# Patient Record
Sex: MALE | Race: WHITE | Employment: STUDENT | URBAN - METROPOLITAN AREA
[De-identification: names, ages, dates, MRNs, and addresses within clinical notes are randomized per-mention and may not be internally consistent; named-entity substitution may affect disease eponyms.]

---
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
## 2024-09-01 ENCOUNTER — HOSPITAL ENCOUNTER (EMERGENCY)
Facility: HOSPITAL | Age: 24
Discharge: HOME | End: 2024-09-01
Attending: EMERGENCY MEDICINE
Payer: COMMERCIAL

## 2024-09-01 ENCOUNTER — APPOINTMENT (OUTPATIENT)
Dept: GENERAL RADIOLOGY | Facility: HOSPITAL | Age: 24
End: 2024-09-01
Payer: COMMERCIAL

## 2024-09-01 VITALS
SYSTOLIC BLOOD PRESSURE: 137 MMHG | HEART RATE: 74 BPM | RESPIRATION RATE: 18 BRPM | TEMPERATURE: 97 F | OXYGEN SATURATION: 99 % | WEIGHT: 200 LBS | BODY MASS INDEX: 28 KG/M2 | DIASTOLIC BLOOD PRESSURE: 79 MMHG | HEIGHT: 71 IN

## 2024-09-01 DIAGNOSIS — S92.404A CLOSED NONDISPLACED FRACTURE OF PHALANX OF RIGHT GREAT TOE, UNSPECIFIED PHALANX, INITIAL ENCOUNTER: Primary | ICD-10-CM

## 2024-09-01 PROCEDURE — 99283 EMERGENCY DEPT VISIT LOW MDM: CPT

## 2024-09-01 PROCEDURE — 73660 X-RAY EXAM OF TOE(S): CPT | Performed by: EMERGENCY MEDICINE

## 2024-09-01 PROCEDURE — 28490 TREAT BIG TOE FRACTURE: CPT

## 2024-09-01 NOTE — ED INITIAL ASSESSMENT (HPI)
Patient presents to ER with complaints of right great toe pain/ injury.   Patient admits he slipped down the stairs and landed with the toe tucked under him.   + swelling and bruising seen in triage.   CMS intact.

## 2024-09-01 NOTE — ED PROVIDER NOTES
Patient Seen in: [facility] Emergency Department    History     Chief Complaint   Patient presents with    Toe Injury     Stated Complaint: toe pain    HPI    HPI: Clayton Harris is a 23 year old male who presents after an injury to the r great foot that occurred when he fell down stairs. The patient was able to bear weight immediately after the injury and here in the emergency department. Patient complains of moderate pain worse with movement and weight bearing. No knee pain. No other joint pain or injuries.     Past Medical History:    Autistic disorder       History reviewed. No pertinent surgical history.         Family History   Problem Relation Age of Onset    Diabetes Paternal Grandfather     Diabetes Other         MGGF    Cancer Other         PGGM; Uterine    Other (Early Death) Other         PGGF; 44 yrs; heart attack    Heart Disease Other         PGGF, PGGM, MGGF, MGGM    High Blood Pressure Paternal Grandmother     High Blood Pressure Maternal Grandfather        Social History     Socioeconomic History    Marital status: Single   Tobacco Use    Smoking status: Never    Smokeless tobacco: Never   Vaping Use    Vaping status: Never Used   Substance and Sexual Activity    Alcohol use: Yes     Comment: occ    Drug use: Not Currently       Review of Systems    Positive for stated complaint: toe pain  Other systems are as noted in HPI.  Constitutional and vital signs reviewed.      All other systems reviewed and negative except as noted above.    PSFH elements reviewed from today and agreed except as otherwise stated in HPI.    Physical Exam     ED Triage Vitals [09/01/24 1257]   /79   Pulse 74   Resp 18   Temp 96.9 °F (36.1 °C)   Temp src    SpO2 99 %   O2 Device None (Room air)       Current:/79   Pulse 74   Temp 96.9 °F (36.1 °C)   Resp 18   Ht 180.3 cm (5' 11\")   Wt 90.7 kg   SpO2 99%   BMI 27.89 kg/m²         Physical Exam      MENTAL STATUS: Alert, oriented, and cooperative.  No focal deficit  HEAD: Atraumatic  NECK: Supple, full range of motion without pain or paresthesias  EXTREMITIES: The r  foot is tender over great toe. There is no ligamentous instability to anterior drawer. There is no notable deformity.  Achilles is palpated and intact functionally.    .NEURO:Sensation to touch is intact.  SKIN: No open wounds, no rashes.  PSYCH: Normal affect. Calm and cooperative.    ED Course   Labs Reviewed - No data to display    MDM       XR TOE(S) (MIN 2 VIEWS), RIGHT 1ST (CPT=73660)    Result Date: 9/1/2024  CONCLUSION: Acute nondisplaced spiral articular fracture involving the base and midportion of the right 1st proximal phalanx.  There is also a transverse fracture within the neck of the bone.  The major fracture fragments are in near anatomic position.  Soft tissue inflammation is present right 1st toe.   Dictated by (CST): Rich Leung MD on 9/01/2024 at 1:34 PM     Finalized by (CST): Rich Leung MD on 9/01/2024 at 1:36 PM           I reviewed xray noted r great toe fracture  Medical Decision Making  Problems Addressed:  Closed nondisplaced fracture of phalanx of right great toe, unspecified phalanx, initial encounter: acute illness or injury     Details: RICE. Immobilize.  nsaids    Amount and/or Complexity of Data Reviewed  Radiology: ordered and independent interpretation performed. Decision-making details documented in ED Course.  Discussion of management or test interpretation with external provider(s): Tylenol, motrin recommended.      Risk  OTC drugs.        Disposition and Plan     Clinical Impression:  1. Closed nondisplaced fracture of phalanx of right great toe, unspecified phalanx, initial encounter        Disposition:  There is no disposition on file for this visit.    Follow-up:  No follow-up provider specified.    Medications Prescribed:  Current Discharge Medication List